# Patient Record
Sex: MALE | Race: WHITE
[De-identification: names, ages, dates, MRNs, and addresses within clinical notes are randomized per-mention and may not be internally consistent; named-entity substitution may affect disease eponyms.]

---

## 2020-08-25 ENCOUNTER — HOSPITAL ENCOUNTER (OUTPATIENT)
Dept: HOSPITAL 19 - SURG | Age: 55
LOS: 1 days | Discharge: HOME | End: 2020-08-26
Attending: UROLOGY
Payer: COMMERCIAL

## 2020-08-25 VITALS — SYSTOLIC BLOOD PRESSURE: 142 MMHG | HEART RATE: 78 BPM | TEMPERATURE: 97.9 F | DIASTOLIC BLOOD PRESSURE: 74 MMHG

## 2020-08-25 VITALS — HEART RATE: 74 BPM | DIASTOLIC BLOOD PRESSURE: 84 MMHG | SYSTOLIC BLOOD PRESSURE: 110 MMHG

## 2020-08-25 VITALS — SYSTOLIC BLOOD PRESSURE: 133 MMHG | TEMPERATURE: 98.3 F | DIASTOLIC BLOOD PRESSURE: 78 MMHG | HEART RATE: 74 BPM

## 2020-08-25 VITALS — DIASTOLIC BLOOD PRESSURE: 77 MMHG | HEART RATE: 55 BPM | TEMPERATURE: 97.4 F | SYSTOLIC BLOOD PRESSURE: 122 MMHG

## 2020-08-25 VITALS — HEART RATE: 78 BPM | TEMPERATURE: 97.9 F | DIASTOLIC BLOOD PRESSURE: 65 MMHG | SYSTOLIC BLOOD PRESSURE: 147 MMHG

## 2020-08-25 VITALS — WEIGHT: 212.97 LBS | BODY MASS INDEX: 30.49 KG/M2 | HEIGHT: 70 IN

## 2020-08-25 VITALS — TEMPERATURE: 97.9 F | SYSTOLIC BLOOD PRESSURE: 123 MMHG | DIASTOLIC BLOOD PRESSURE: 71 MMHG | HEART RATE: 71 BPM

## 2020-08-25 VITALS — TEMPERATURE: 98.6 F | HEART RATE: 79 BPM | SYSTOLIC BLOOD PRESSURE: 127 MMHG | DIASTOLIC BLOOD PRESSURE: 69 MMHG

## 2020-08-25 VITALS — SYSTOLIC BLOOD PRESSURE: 128 MMHG | HEART RATE: 74 BPM | DIASTOLIC BLOOD PRESSURE: 70 MMHG

## 2020-08-25 VITALS — HEART RATE: 78 BPM | DIASTOLIC BLOOD PRESSURE: 74 MMHG | SYSTOLIC BLOOD PRESSURE: 141 MMHG

## 2020-08-25 VITALS — SYSTOLIC BLOOD PRESSURE: 148 MMHG | DIASTOLIC BLOOD PRESSURE: 70 MMHG | HEART RATE: 78 BPM

## 2020-08-25 VITALS — DIASTOLIC BLOOD PRESSURE: 74 MMHG | SYSTOLIC BLOOD PRESSURE: 126 MMHG | HEART RATE: 74 BPM

## 2020-08-25 DIAGNOSIS — Z79.82: ICD-10-CM

## 2020-08-25 DIAGNOSIS — Z88.8: ICD-10-CM

## 2020-08-25 DIAGNOSIS — F17.290: ICD-10-CM

## 2020-08-25 DIAGNOSIS — R31.9: ICD-10-CM

## 2020-08-25 DIAGNOSIS — C67.9: Primary | ICD-10-CM

## 2020-08-25 DIAGNOSIS — E78.00: ICD-10-CM

## 2020-08-25 DIAGNOSIS — G89.29: ICD-10-CM

## 2020-08-25 DIAGNOSIS — Z80.1: ICD-10-CM

## 2020-08-25 DIAGNOSIS — J45.909: ICD-10-CM

## 2020-08-25 DIAGNOSIS — I10: ICD-10-CM

## 2020-08-25 DIAGNOSIS — F41.9: ICD-10-CM

## 2020-08-25 DIAGNOSIS — G47.33: ICD-10-CM

## 2020-08-25 PROCEDURE — C1769 GUIDE WIRE: HCPCS

## 2020-08-25 PROCEDURE — C2617 STENT, NON-COR, TEM W/O DEL: HCPCS

## 2020-08-25 NOTE — NUR
Patient remains standing at bedside with 10/10 pain.  100ml clear cherry urine
noted in bermeo bag.  Patient continues to yell/curse from pain.

## 2020-08-25 NOTE — NUR
Patient to room 349 from PACU.  Alert and oriented, answers questions
appropriately.  Ewing catheter in place, CBI infusing.  Patient c/o
pain/pressure to penis/bladder.  States "I feel like I need to pee, my bladder
is full".  Patient requests to stand at side of bed because pain is more
tolerable standing.  Abdomen soft, non tender, non distended.

## 2020-08-25 NOTE — NUR
Patient continues to stand at side of bed, yelling out/cursing because pain is
untolerable.  Patient bermeo bag with 500ml clear cherry urine, 400ml present
on admission to unit.  Patient requests bermeo  be discontinued.  Bermeo
catheter irrigated, no urine return noted.

## 2020-08-26 VITALS — SYSTOLIC BLOOD PRESSURE: 122 MMHG | DIASTOLIC BLOOD PRESSURE: 67 MMHG | TEMPERATURE: 99.1 F | HEART RATE: 62 BPM

## 2020-08-26 VITALS — HEART RATE: 79 BPM | SYSTOLIC BLOOD PRESSURE: 147 MMHG | TEMPERATURE: 98.1 F | DIASTOLIC BLOOD PRESSURE: 87 MMHG

## 2020-08-26 VITALS — SYSTOLIC BLOOD PRESSURE: 155 MMHG | DIASTOLIC BLOOD PRESSURE: 88 MMHG | TEMPERATURE: 98.4 F | HEART RATE: 89 BPM

## 2020-08-26 NOTE — NUR
Patient uses call light as he is ready to leave. Patient ambulates to POV with
all belongings with wife and SHILA Samano.

## 2020-08-26 NOTE — NUR
Up in room independently. Urinating red tinged urine without difficulty.
Having gas pains in stomach. Denies additional needs or concerns at this time.

## 2020-08-26 NOTE — NUR
Reviewed discharge instructions with the patient and his wife. Deny questions
and sign all discharge documents. Provided patient with the discharge packet.
Patient and wife will gather all belongings at this time and will push call
light when ready for discharge.

## 2020-08-26 NOTE — NUR
Patient very restless this shift. Patient ambulated the halls frequently and
appears to be awake when staff enter the room. Unsure that patient has slept
at all, although he states he has been "napping". Patient is voiding well
throughout the night. Continues to be light red/pink. Patient and wife had
multiple questions at the beginning of the shift. This nurse answered them to
the best of my ability. Patient has steady gait. Ambulates independently. Dr. Meneses called about 2100 to check in on patient. New orders received to assist
patient with pain relief, including Percocet 5-325mg q4hr PRN, Toradol 15mg q6
IV PRN, and Pyridium 100mg PO TID. Patient stated he felt better after these
were administered. Patient denies any further needs. Will continue to monitor.

## 2020-08-26 NOTE — NUR
Patient up independently in room. Denies pain. Urinating without difficulty.
Urine dark orange in color. Provided ice water to patient. Patient wife in
room at this time. Denies any additional needs.